# Patient Record
Sex: MALE | URBAN - METROPOLITAN AREA
[De-identification: names, ages, dates, MRNs, and addresses within clinical notes are randomized per-mention and may not be internally consistent; named-entity substitution may affect disease eponyms.]

---

## 2018-05-10 ENCOUNTER — TELEPHONE (OUTPATIENT)
Dept: OTHER | Facility: CLINIC | Age: 58
End: 2018-05-10

## 2018-05-10 NOTE — TELEPHONE ENCOUNTER
5/10/2018    Call Regarding [facility] [employer] choices     Attempt 1    Message on voicemail    Comments:       Outreach   Sophia Carrillo

## 2018-07-31 NOTE — TELEPHONE ENCOUNTER
07/31/2018    Call Regarding Onboarding [employer] choices     Attempt 3    Message on voicemail    Comments:       Outreach   Rita Hoover